# Patient Record
Sex: MALE | Race: WHITE | RURAL
[De-identification: names, ages, dates, MRNs, and addresses within clinical notes are randomized per-mention and may not be internally consistent; named-entity substitution may affect disease eponyms.]

---

## 2019-01-03 ENCOUNTER — OFFICE VISIT (OUTPATIENT)
Dept: FAMILY MEDICINE CLINIC | Age: 44
End: 2019-01-03

## 2019-01-03 VITALS
TEMPERATURE: 98.5 F | HEART RATE: 71 BPM | DIASTOLIC BLOOD PRESSURE: 81 MMHG | BODY MASS INDEX: 30.66 KG/M2 | RESPIRATION RATE: 16 BRPM | OXYGEN SATURATION: 98 % | WEIGHT: 207 LBS | HEIGHT: 69 IN | SYSTOLIC BLOOD PRESSURE: 132 MMHG

## 2019-01-03 DIAGNOSIS — H92.01 RIGHT EAR PAIN: ICD-10-CM

## 2019-01-03 DIAGNOSIS — F17.200 SMOKER: ICD-10-CM

## 2019-01-03 DIAGNOSIS — H65.01 RIGHT ACUTE SEROUS OTITIS MEDIA, RECURRENCE NOT SPECIFIED: Primary | ICD-10-CM

## 2019-01-03 PROBLEM — R03.0 ELEVATED BLOOD PRESSURE READING: Status: ACTIVE | Noted: 2019-01-03

## 2019-01-03 RX ORDER — AMOXICILLIN AND CLAVULANATE POTASSIUM 875; 125 MG/1; MG/1
1 TABLET, FILM COATED ORAL EVERY 12 HOURS
Qty: 14 TAB | Refills: 0 | Status: CANCELLED | OUTPATIENT
Start: 2019-01-03 | End: 2019-01-10

## 2019-01-03 RX ORDER — CEFDINIR 300 MG/1
300 CAPSULE ORAL 2 TIMES DAILY
Qty: 20 CAP | Refills: 0 | Status: SHIPPED | OUTPATIENT
Start: 2019-01-03 | End: 2019-01-13

## 2019-01-03 NOTE — PROGRESS NOTES
[facility] Residency Program  
Outpatient Resident Progress Note Encounter Date: 1/3/2019 Chief Complaint Patient presents with Asha Melgar [facility]  Jaw Pain History of Present Illness Patient is a 39 y.o. male, who presents to clinic for Establish Care and Jaw Pain Patient presents with Rt ear pain for the last 2 weeks. The pain is constant, 5/10 in pain scale, and mildly affects his hearing. He reports no secretions or blood from the ear canal. Denies rash, fever, fatigue, dizziness, lightheadedness, headaches, changes in vision, cough, sore throat, CP, SOB, sputum production, nausea, vomiting, or any other complains at this moment. Reports no ill contacts or recent swimming activities. Review of Systems A complete ROS was reviewed and only pertinent items documented on HPI. Allergies - reviewed:  
No Known Allergies Medications - reviewed:  
Current Outpatient Medications Medication Sig  cefdinir (OMNICEF) 300 mg capsule Take 1 Cap by mouth two (2) times a day for 10 days. No current facility-administered medications for this visit. Past Medical History - reviewed: 
Past Medical History:  
Diagnosis Date  Cancer ([facility] [address] Family Medical History - reviewed: 
No family history on file. Objective Visit Vitals /81 (BP 1 Location: Right arm, BP Patient Position: Sitting) Pulse 71 Temp 98.5 °F (36.9 °C) (Oral) Resp 16 Ht 5' 9\" (1.753 m) Wt 207 lb (93.9 kg) SpO2 98% BMI 30.57 kg/m² Body mass index is 30.57 kg/m². Nursing note and vitals reviewed. Physical Exam 
Constitutional: Well-developed, well-nourished, and in no distress. Obese. HENT:  
Head: Normocephalic and atraumatic. Right Ear: Normal ear cannel. Bulging of TM with serous fluid. Erythematous TM. Left Ear: External ear normal.  
Nose: Nose normal.  
Mouth/Throat: Oropharynx is clear and moist. No oropharyngeal erythema, no exudate. Eyes: Conjunctivae and EOM are normal. Pupils are equal, round, and reactive to light. Right eye exhibits no discharge. Left eye exhibits no discharge. No scleral icterus. Neck: Normal range of motion. Neck supple. No JVD present. No tracheal deviation present. No thyromegaly present. Lymphadenopathy: No cervical adenopathy. Cardiovascular: Normal rate, regular rhythm, normal heart sounds and intact distal pulses. Exam reveals no gallop and no friction rub. No murmur heard. Pulmonary/Chest: Effort normal and breath sounds normal. No respiratory distress. No wheezes, no rales, no tenderness. Neurological: Alert and oriented. No focal findings. Skin: Skin is warm and dry. No rash noted. Not diaphoretic. No erythema. No pallor. Assessment / Plan Mr. Billy Carrillo is a 39 y.o. male with the following medical condition(s): 1. Right acute serous otitis media, recurrence not specified 
- cefdinir (OMNICEF) 300 mg capsule; Take 1 Cap by mouth two (2) times a day for 10 days. Dispense: 20 Cap; Refill: 0 
 
2. Smoker The patient was counseled on the dangers of tobacco use, and was advised to quit. Reviewed strategies to maximize success, including removing cigarettes and smoking materials from environment, stress management, substitution of other forms of reinforcement, support of family/friends and written materials. 1 PPD for the last 28 years. Patient states that he will think about quitting seriously. Patient was advised to return to clinic in case of worsening of symptoms or fail to improve. Life threatening signs and symptoms were discussed and patient advised to go to the nearest ED for prompt evaluation and care in case of onset of any of these. Follow-up Disposition: 
Return if symptoms worsen or fail to improve. · I have discussed the diagnosis with the patient and the intended plan as seen in the above orders.   The patient has received an after-visit summary and questions were answered concerning future plans. I have discussed medication side effects and warnings with the patient as well. Patient/Plan discussed with Dr. Zeenat Katz (Attending Physician) Isaac Arias MD 
PGY-3 Family Medicine Resident Encounter Date: 1/3/2019

## 2019-01-03 NOTE — PROGRESS NOTES
I discussed the findings, assessment and plan for Omnicef in detail with the resident and agree with the resident's findings and plan as documented in the resident's note. Duyen Dorman M.D.

## 2019-01-03 NOTE — PATIENT INSTRUCTIONS
Stopping Smoking: Care Instructions Your Care Instructions Cigarette smokers crave the nicotine in cigarettes. Giving it up is much harder than simply changing a habit. Your body has to stop craving the nicotine. It is hard to quit, but you can do it. There are many tools that people use to quit smoking. You may find that combining tools works best for you. There are several steps to quitting. First you get ready to quit. Then you get support to help you. After that, you learn new skills and behaviors to become a nonsmoker. For many people, a necessary step is getting and using medicine. Your doctor will help you set up the plan that best meets your needs. You may want to attend a smoking cessation program to help you quit smoking. When you choose a program, look for one that has proven success. Ask your doctor for ideas. You will greatly increase your chances of success if you take medicine as well as get counseling or join a cessation program. 
Some of the changes you feel when you first quit tobacco are uncomfortable. Your body will miss the nicotine at first, and you may feel short-tempered and grumpy. You may have trouble sleeping or concentrating. Medicine can help you deal with these symptoms. You may struggle with changing your smoking habits and rituals. The last step is the tricky one: Be prepared for the smoking urge to continue for a time. This is a lot to deal with, but keep at it. You will feel better. Follow-up care is a key part of your treatment and safety. Be sure to make and go to all appointments, and call your doctor if you are having problems. It's also a good idea to know your test results and keep a list of the medicines you take. How can you care for yourself at home? · Ask your family, friends, and coworkers for support. You have a better chance of quitting if you have help and support. · Join a support group, such as Nicotine Anonymous, for people who are trying to quit smoking. · Consider signing up for a smoking cessation program, such as the American Lung Association's Freedom from Smoking program. 
· Get text messaging support. Go to the website at www.smokefree. gov to sign up for the [facility] program. 
· Set a quit date. Pick your date carefully so that it is not right in the middle of a big deadline or stressful time. Once you quit, do not even take a puff. Get rid of all ashtrays and lighters after your last cigarette. Clean your house and your clothes so that they do not smell of smoke. · Learn how to be a nonsmoker. Think about ways you can avoid those things that make you reach for a cigarette. ? Avoid situations that put you at greatest risk for smoking. For some people, it is hard to have a drink with friends without smoking. For others, they might skip a coffee break with coworkers who smoke. ? Change your daily routine. Take a different route to work or eat a meal in a different place. · Cut down on stress. Calm yourself or release tension by doing an activity you enjoy, such as reading a book, taking a hot bath, or gardening. · Talk to your doctor or pharmacist about nicotine replacement therapy, which replaces the nicotine in your body. You still get nicotine but you do not use tobacco. Nicotine replacement products help you slowly reduce the amount of nicotine you need. These products come in several forms, many of them available over-the-counter: ? Nicotine patches ? Nicotine gum and lozenges ? Nicotine inhaler · Ask your doctor about bupropion (Wellbutrin) or varenicline (Chantix), which are prescription medicines. They do not contain nicotine. They help you by reducing withdrawal symptoms, such as stress and anxiety. · Some people find hypnosis, acupuncture, and massage helpful for ending the smoking habit. · Eat a healthy diet and get regular exercise. Having healthy habits will help your body move past its craving for nicotine. · Be prepared to keep trying. Most people are not successful the first few times they try to quit. Do not get mad at yourself if you smoke again. Make a list of things you learned and think about when you want to try again, such as next week, next month, or next year. Where can you learn more? Go to http://dick-yanely.info/. Enter X682 in the search box to learn more about \"Stopping Smoking: Care Instructions. \" Current as of: November 29, 2017 Content Version: 11.8 © 8034-8486 [employer]. Care instructions adapted under license by [employer] (which disclaims liability or warranty for this information). If you have questions about a medical condition or this instruction, always ask your healthcare professional. [address] any warranty or liability for your use of this information. Ear Infection (Otitis Media): Care Instructions Your Care Instructions An ear infection may start with a cold and affect the middle ear (otitis media). It can hurt a lot. Most ear infections clear up on their own in a couple of days. Most often you will not need antibiotics. This is because many ear infections are caused by a virus. Antibiotics don't work against a virus. Regular doses of pain medicines are the best way to reduce your fever and help you feel better. Follow-up care is a key part of your treatment and safety. Be sure to make and go to all appointments, and call your doctor if you are having problems. It's also a good idea to know your test results and keep a list of the medicines you take. How can you care for yourself at home? · Take pain medicines exactly as directed. ? If the doctor gave you a prescription medicine for pain, take it as prescribed.  
? If you are not taking a prescription pain medicine, take an over-the-counter medicine, such as acetaminophen (Tylenol), ibuprofen (Advil, Motrin), or naproxen (Aleve). Read and follow all instructions on the label. ? Do not take two or more pain medicines at the same time unless the doctor told you to. Many pain medicines have acetaminophen, which is Tylenol. Too much acetaminophen (Tylenol) can be harmful. · Plan to take a full dose of pain reliever before bedtime. Getting enough sleep will help you get better. · Try a warm, moist washcloth on the ear. It may help relieve pain. · If your doctor prescribed antibiotics, take them as directed. Do not stop taking them just because you feel better. You need to take the full course of antibiotics. When should you call for help? Call your doctor now or seek immediate medical care if: 
  · You have new or increasing ear pain.  
  · You have new or increasing pus or blood draining from your ear.  
  · You have a fever with a stiff neck or a severe headache.  
 Watch closely for changes in your health, and be sure to contact your doctor if: 
  · You have new or worse symptoms.  
  · You are not getting better after taking an antibiotic for 2 days. Where can you learn more? Go to http://dick-yanely.info/. Enter W431 in the search box to learn more about \"Ear Infection (Otitis Media): Care Instructions. \" Current as of: March 28, 2018 Content Version: 11.8 © 8143-9006 [employer]. Care instructions adapted under license by [employer] (which disclaims liability or warranty for this information). If you have questions about a medical condition or this instruction, always ask your healthcare professional. [place] [address] any warranty or liability for your use of this information.

## 2019-01-03 NOTE — PROGRESS NOTES
1. Have you been to the ER, urgent care clinic since your last visit? Hospitalized since your last visit? No 
 
2. Have you seen or consulted any other health care providers outside of the [address] [place] [address] since your last visit? Include any pap smears or colon screening. No 
Reviewed record in preparation for visit and have necessary documentation Pt did not bring medication to office visit for review Goals that were addressed and/or need to be completed during or after this appointment include Health Maintenance Due Topic Date Due  
 DTaP/Tdap/Td series (1 - Tdap) 06/20/1995  Influenza Age 5 to Adult  08/01/2018 Patient declines all immunizations.

## 2019-10-22 ENCOUNTER — OFFICE VISIT (OUTPATIENT)
Dept: FAMILY MEDICINE CLINIC | Age: 44
End: 2019-10-22

## 2019-10-22 DIAGNOSIS — R03.0 ELEVATED BLOOD PRESSURE READING: Primary | ICD-10-CM

## 2019-10-22 DIAGNOSIS — Z00.00 WELL ADULT EXAM: ICD-10-CM

## 2019-10-22 DIAGNOSIS — C64.9 MALIGNANT NEOPLASM OF KIDNEY, UNSPECIFIED LATERALITY (HCC): ICD-10-CM

## 2019-10-22 DIAGNOSIS — Z72.0 TOBACCO ABUSE: ICD-10-CM

## 2019-10-22 DIAGNOSIS — Z28.21 INFLUENZA VACCINATION DECLINED: ICD-10-CM

## 2019-10-22 DIAGNOSIS — Z13.220 LIPID SCREENING: ICD-10-CM

## 2019-10-24 VITALS
OXYGEN SATURATION: 97 % | DIASTOLIC BLOOD PRESSURE: 89 MMHG | WEIGHT: 204 LBS | BODY MASS INDEX: 30.21 KG/M2 | HEIGHT: 69 IN | RESPIRATION RATE: 16 BRPM | HEART RATE: 83 BPM | SYSTOLIC BLOOD PRESSURE: 138 MMHG | TEMPERATURE: 98.7 F

## 2019-10-24 LAB
ALBUMIN SERPL-MCNC: 5 G/DL (ref 3.5–5.5)
ALBUMIN/GLOB SERPL: 1.9 {RATIO} (ref 1.2–2.2)
ALP SERPL-CCNC: 121 IU/L (ref 39–117)
ALT SERPL-CCNC: 25 IU/L (ref 0–44)
AST SERPL-CCNC: 21 IU/L (ref 0–40)
BILIRUB SERPL-MCNC: 0.4 MG/DL (ref 0–1.2)
BUN SERPL-MCNC: 10 MG/DL (ref 6–24)
BUN/CREAT SERPL: 11 (ref 9–20)
CALCIUM SERPL-MCNC: 9.9 MG/DL (ref 8.7–10.2)
CHLORIDE SERPL-SCNC: 98 MMOL/L (ref 96–106)
CHOLEST SERPL-MCNC: 211 MG/DL (ref 100–199)
CO2 SERPL-SCNC: 25 MMOL/L (ref 20–29)
CREAT SERPL-MCNC: 0.91 MG/DL (ref 0.76–1.27)
GLOBULIN SER CALC-MCNC: 2.6 G/DL (ref 1.5–4.5)
GLUCOSE SERPL-MCNC: 93 MG/DL (ref 65–99)
HDLC SERPL-MCNC: 44 MG/DL
LDLC SERPL CALC-MCNC: 118 MG/DL (ref 0–99)
POTASSIUM SERPL-SCNC: 4.6 MMOL/L (ref 3.5–5.2)
PROT SERPL-MCNC: 7.6 G/DL (ref 6–8.5)
SODIUM SERPL-SCNC: 136 MMOL/L (ref 134–144)
TRIGL SERPL-MCNC: 244 MG/DL (ref 0–149)
VLDLC SERPL CALC-MCNC: 49 MG/DL (ref 5–40)

## 2019-10-25 PROBLEM — Z72.0 TOBACCO ABUSE: Status: ACTIVE | Noted: 2019-10-25

## 2019-10-25 PROBLEM — H65.01 RIGHT ACUTE SEROUS OTITIS MEDIA: Status: RESOLVED | Noted: 2019-01-03 | Resolved: 2019-10-25

## 2019-10-25 PROBLEM — C64.9 CANCER OF KIDNEY (HCC): Status: ACTIVE | Noted: 2019-10-25

## 2019-10-25 PROBLEM — E66.9 OBESITY (BMI 30-39.9): Status: ACTIVE | Noted: 2019-10-25

## 2019-10-25 NOTE — PROGRESS NOTES
Results noted. . ASCVD risk score 7.9%. Per USPSTF guidelines recommends trial of lifestyle modifications before starting a statin. Will call and discuss options with the patient.      Lady Velasquez MD  PGY2 Family Medicine Resident

## 2019-10-25 NOTE — PROGRESS NOTES
Progress Note    Patient: Tracee Wu MRN: [id number]  SSN: [id number]    YOB: 1975  Age: 38 y.o. Sex: male        Chief Complaint   Patient presents with    Annual Wellness Visit         Subjective:     Problems addressed:  Encounter Diagnoses     ICD-10-CM ICD-9-CM   1. Elevated blood pressure reading R03.0 796.2   2. Tobacco abuse Z72.0 305.1   3. Malignant neoplasm of kidney, unspecified laterality (HCC) C64.9 189.0   4. Well adult exam Z00.00 V70.0   5. Influenza vaccination declined Z28.21 V64.06   6. Lipid screening Z13.220 V77.91       37 y/o M presents for annual wellness visit. Pt says he had an elevated BP at work and needs to get checked out. Is not currently taking any medications. Has a history of kidney cancer for which he underwent partial kidney resection years ago and has been in remission since, followed by Nephrologist and gets scans every 6 months which have most recently been normal. Pt still smoking, 1.5 PPD, occasionally vapes, and sometimes uses chewing tobacco. Pt does not want a flu shot today. Current and past medical information:    Current Medications after this visit[de-identified]     No current outpatient medications on file. No current facility-administered medications for this visit.         Patient Active Problem List    Diagnosis Date Noted    Tobacco abuse 10/25/2019    Cancer of kidney ([facility] [address] 10/25/2019    Obesity (BMI 30-39.9) 10/25/2019    Elevated blood pressure reading 01/03/2019       Past Medical History:   Diagnosis Date    Cancer ([facility] [address]        No Known Allergies    Past Surgical History:   Procedure Laterality Date    HX ORTHOPAEDIC         Social History     Tobacco Use    Smoking status: Current Every Day Smoker     Packs/day: 1.50    Smokeless tobacco: Current User     Types: Chew, Snuff   Substance Use Topics    Alcohol use: Yes     Frequency: Monthly or less    Drug use: No         Review of Systems   Constitutional: Negative for chills and fever. HENT: Negative for sore throat. Respiratory: Negative for shortness of breath. Cardiovascular: Negative for chest pain and palpitations. Gastrointestinal: Negative for nausea and vomiting. Neurological: Negative for headaches. Endo/Heme/Allergies: Negative for environmental allergies. Objective:     Vitals:    10/22/19 1417   BP: 138/89   Pulse: 83   Resp: 16   Temp: 98.7 °F (37.1 °C)   SpO2: 97%   Weight: 204 lb (92.5 kg)   Height: 5' 9\" (1.753 m)      Body mass index is 30.13 kg/m². Physical Exam   Constitutional: He appears well-developed and well-nourished. No distress. HENT:   Head: Normocephalic and atraumatic. Right Ear: External ear normal.   Left Ear: External ear normal.   Mouth/Throat: Oropharynx is clear and moist. No oropharyngeal exudate. Eyes: Pupils are equal, round, and reactive to light. Conjunctivae are normal.   Neck: Neck supple. Cardiovascular: Normal rate, regular rhythm and normal heart sounds. No murmur heard. Pulmonary/Chest: Effort normal and breath sounds normal. No respiratory distress. He has no wheezes. Abdominal: Soft. Bowel sounds are normal. He exhibits no distension. There is no tenderness. Musculoskeletal: Normal range of motion. He exhibits no edema or deformity. Lymphadenopathy:     He has no cervical adenopathy. Neurological: He is alert. Skin: Skin is warm and dry. He is not diaphoretic. No erythema. Psychiatric: He has a normal mood and affect. Vitals reviewed. Health Maintenance Due   Topic Date Due    Pneumococcal 0-64 years (1 of 1 - PPSV23) 06/20/1980    Influenza Age 5 to Adult  08/01/2019       Assessment and orders:     Encounter Diagnoses     ICD-10-CM ICD-9-CM   1. Elevated blood pressure reading R03.0 796.2   2. Tobacco abuse Z72.0 305.1   3. Malignant neoplasm of kidney, unspecified laterality (HCC) C64.9 189.0   4. Well adult exam Z00.00 V70.0   5.  Influenza vaccination declined Z28.21 V64.06   6. Lipid screening Z13.220 V77.91       39 y/o M with borderline HTN and tobacco abuse    1. Elevated blood pressure reading - /89 today  -Will continue close monitoring and start medications as indicated  -CMP    2. Tobacco abuse - 1.5 PPD, vaping, and chewing tobacco, pt said he tried to quit smoking by vaping but failed  -Encouraging cessation, pt not willing to quit at this time    3. Malignant neoplasm of kidney, unspecified laterality ([facility] [address] - currently in remission  -Continue to f/u with Nephrologist  -CMP    4. Influenza vaccination declined    5. Lipid screening  -Lipid panel        Plan of care:  Discussed diagnoses in detail with patient. Medication risks/benefits/side effects discussed with patient. All of the patient's questions were addressed. The patient understands and agrees with our plan of care. The patient knows to call back if they are unsure of or forget any changes we discussed today or if the symptoms change. The patient received an After-Visit Summary which contains VS, orders, medication list and allergy list. This can be used as a \"mini-medical record\" should they have to seek medical care while out of town. Follow-up and Dispositions    · Return in about 1 month (around 11/22/2019) for Chronic Medical Problems, Blood Pressure Check. No future appointments.     Signed By: Wilton Diaz MD     October 25, 2019

## 2019-10-29 ENCOUNTER — TELEPHONE (OUTPATIENT)
Dept: FAMILY MEDICINE CLINIC | Age: 44
End: 2019-10-29

## 2019-10-29 NOTE — TELEPHONE ENCOUNTER
Called and discussed lab results with patient. Will move forward with lifestyle modifications for elevated cholesterol. Pt said he would stop by clinic today for copy of lab results as he would be nearby for dental apt. Pt instructed to make follow up appointment for blood pressure check. Pt voiced understanding and agreement. All questions answered.      Tonie Santos MD  11:59 AM

## 2019-11-05 ENCOUNTER — TELEPHONE (OUTPATIENT)
Dept: FAMILY MEDICINE CLINIC | Age: 44
End: 2019-11-05

## 2019-11-05 NOTE — TELEPHONE ENCOUNTER
----- Message from Alondra King sent at 11/5/2019 12:55 PM EST -----  Regarding: Dr. Maru Patel/telephone   Pt called to see if the office has received his medical records from [place].  Best contact number is [phone]

## 2019-11-14 ENCOUNTER — OFFICE VISIT (OUTPATIENT)
Dept: FAMILY MEDICINE CLINIC | Age: 44
End: 2019-11-14

## 2019-11-14 VITALS
HEIGHT: 69 IN | BODY MASS INDEX: 29.77 KG/M2 | OXYGEN SATURATION: 98 % | RESPIRATION RATE: 16 BRPM | TEMPERATURE: 98.3 F | DIASTOLIC BLOOD PRESSURE: 75 MMHG | WEIGHT: 201 LBS | SYSTOLIC BLOOD PRESSURE: 133 MMHG | HEART RATE: 90 BPM

## 2019-11-14 DIAGNOSIS — Z72.0 TOBACCO ABUSE: ICD-10-CM

## 2019-11-14 DIAGNOSIS — R03.0 ELEVATED BLOOD PRESSURE READING: ICD-10-CM

## 2019-11-14 DIAGNOSIS — G47.9 TROUBLE IN SLEEPING: ICD-10-CM

## 2019-11-14 DIAGNOSIS — F90.9 ATTENTION DEFICIT HYPERACTIVITY DISORDER (ADHD), UNSPECIFIED ADHD TYPE: Primary | ICD-10-CM

## 2019-11-14 NOTE — LETTER
NOTIFICATION RETURN TO WORK 
 
11/14/2019 4:18 PM 
 
Mr. Abbi Barragan [address] [address] To Whom It May Concern: 
 
Abbi Barragan is currently under the care of [facility]. He will return to work on: 11/15/19. If there are questions or concerns please have the patient contact our office. Sincerely, Linda Campos MD

## 2019-11-14 NOTE — PROGRESS NOTES
1. Have you been to the ER, urgent care clinic, or been hospitalized since your last visit? No     2. Have you seen or consulted any other health care providers outside of the [address] [place] [address] since your last visit? No     Reviewed record in preparation for visit and have necessary documentation  Goals that were addressed and/or need to be completed during or after this appointment include   Health Maintenance Due   Topic Date Due    Pneumococcal 0-64 years (1 of 1 - PPSV23) 06/20/1980    Influenza Age 5 to Adult  08/01/2019       I have received verbal consent from [place] to discuss any/all medical information while others present in the room.

## 2019-11-14 NOTE — PROGRESS NOTES
Progress Note    Patient: Sima Asencio MRN: [id number]  SSN: [id number]    YOB: 1975  Age: 38 y.o. Sex: male        Chief Complaint   Patient presents with    Behavioral Problem         Subjective:     Problems addressed:  Encounter Diagnoses     ICD-10-CM ICD-9-CM   1. Attention deficit hyperactivity disorder (ADHD), unspecified ADHD type F90.9 314.01   2. Elevated blood pressure reading R03.0 796.2   3. Tobacco abuse Z72.0 305.1   4. Trouble in sleeping G47.9 780.50     37 y/o M presents for behavioral problem. Pt states \"every year or so, I can't turn off my brain\", has temper issues, has gone through 2 marriages in the past, and symptoms worsening for past 6 months. Racing thoughts and trouble falling asleep are most bothersome. Says he was diagnosed with ADHD in the past by [facility] in [place], and currently he is trying to see if there are any other underlying problems as there is a program at [employer] that [place] hires people with disabilities\" and ADHD by itself is not enough. Pt is trying to gather different medical records from his past. He says he stopped taking adderal 2 years ago because it did not help him very much. Pt is not currently seeing a psychiatrist. Pt denies any manic episodes in the past, but admits to some of the symptoms with racing thoughts, decreased need for sleep, and fired from his job once. Denies any hx of risky behavior. Current and past medical information:    Current Medications after this visit[de-identified]     No current outpatient medications on file. No current facility-administered medications for this visit.         Patient Active Problem List    Diagnosis Date Noted    Tobacco abuse 10/25/2019    Cancer of kidney ([facility] 10/25/2019    Obesity (BMI 30-39.9) 10/25/2019    Elevated blood pressure reading 01/03/2019       Past Medical History:   Diagnosis Date    Cancer ([facility]        No Known Allergies    Past Surgical History: Procedure Laterality Date    HX NEPHRECTOMY      Partial left    HX ORTHOPAEDIC         Social History     Tobacco Use    Smoking status: Current Every Day Smoker     Packs/day: 1.50    Smokeless tobacco: Current User     Types: Chew, Snuff   Substance Use Topics    Alcohol use: Yes     Frequency: Monthly or less    Drug use: No         Review of Systems   Constitutional: Negative for chills and fever. Cardiovascular: Negative for chest pain and palpitations. Neurological: Negative for dizziness and headaches. Psychiatric/Behavioral: Negative for depression and hallucinations. The patient has insomnia. The patient is not nervous/anxious. Racing thoughts         Objective:     Vitals:    11/14/19 1518 11/14/19 1520 11/14/19 1552   BP: 140/88 144/81 133/75   Pulse: 90     Resp: 16     Temp: 98.3 °F (36.8 °C)     TempSrc: Oral     SpO2: 98%     Weight: 201 lb (91.2 kg)     Height: 5' 9\" (1.753 m)        Body mass index is 29.68 kg/m². Physical Exam   Constitutional: He appears well-developed and well-nourished. No distress. HENT:   Head: Normocephalic and atraumatic. Right Ear: External ear normal.   Left Ear: External ear normal.   Eyes: Conjunctivae are normal.   Neck: Neck supple. Cardiovascular: Normal rate, regular rhythm and normal heart sounds. No murmur heard. Pulmonary/Chest: Effort normal and breath sounds normal. No respiratory distress. He has no wheezes. Musculoskeletal: Normal range of motion. He exhibits no edema. Lymphadenopathy:     He has no cervical adenopathy. Neurological: He is alert. Skin: Skin is warm and dry. He is not diaphoretic. Psychiatric: His speech is tangential. He is hyperactive. Health Maintenance Due   Topic Date Due    Pneumococcal 0-64 years (1 of 1 - PPSV23) 06/20/1980    Influenza Age 5 to Adult  08/01/2019       Assessment and orders:     Encounter Diagnoses     ICD-10-CM ICD-9-CM   1.  Attention deficit hyperactivity disorder (ADHD), unspecified ADHD type F90.9 314.01   2. Elevated blood pressure reading R03.0 796.2   3. Tobacco abuse Z72.0 305.1   4. Trouble in sleeping G47.9 780.50       37 y/o M with racing thoughts and insomnia with concern for underlying psychiatric disorder    1. Attention deficit hyperactivity disorder (ADHD), unspecified ADHD type - pt with history of ADHD and medical records from [facility] in [place] shows hx of being on multiple medications for ADHD, but no record of diagnosis, pt reports ADHD medications didn't really help him much, concern for underlying disorder such as bipolar, pt not requesting ADHD at this time, focused on collection medical records for hx of disability for new job opportunity? Does not like current job. - REFERRAL TO PSYCHIATRY, pt voiced agreement and understanding     2. Elevated blood pressure reading - /75 on recheck, initial elevation   -Will continue to monitor    3. Tobacco abuse - pt continues to smoke 1.5 PPD, not interested in quitting at this time  -Continue to encourage cessation     4. Trouble in sleeping - possibly related to underlying psychiatric disorder vs stress vs poor sleep hygiene   -Referral to psychiatry for evaluation, pt in agreement  -Encouraging good sleep hygiene  -Recommended pt try otc melatonin      Plan of care:  Discussed diagnoses in detail with patient. Medication risks/benefits/side effects discussed with patient. All of the patient's questions were addressed. The patient understands and agrees with our plan of care. The patient knows to call back if they are unsure of or forget any changes we discussed today or if the symptoms change. The patient received an After-Visit Summary which contains VS, orders, medication list and allergy list. This can be used as a \"mini-medical record\" should they have to seek medical care while out of town.     Follow-up and Dispositions    · Return if symptoms worsen or fail to improve. No future appointments.     Signed By: Roberto Velasquez MD     November 14, 2019

## 2019-11-14 NOTE — PATIENT INSTRUCTIONS

## 2019-11-18 NOTE — PROGRESS NOTES
I discussed the findings, assessment and plan in detail with the resident and agree with the resident's findings and plan as documented in the resident's note. Regi Hopkins M.D.

## 2020-01-15 ENCOUNTER — OFFICE VISIT (OUTPATIENT)
Dept: FAMILY MEDICINE CLINIC | Age: 45
End: 2020-01-15

## 2020-01-15 VITALS
RESPIRATION RATE: 18 BRPM | HEART RATE: 70 BPM | WEIGHT: 206 LBS | DIASTOLIC BLOOD PRESSURE: 84 MMHG | SYSTOLIC BLOOD PRESSURE: 118 MMHG | HEIGHT: 69 IN | OXYGEN SATURATION: 96 % | BODY MASS INDEX: 30.51 KG/M2 | TEMPERATURE: 97.7 F

## 2020-01-15 DIAGNOSIS — L03.115 CELLULITIS OF RIGHT LOWER EXTREMITY: Primary | ICD-10-CM

## 2020-01-15 RX ORDER — DOXYCYCLINE 100 MG/1
100 TABLET ORAL 2 TIMES DAILY
Qty: 14 TAB | Refills: 0 | Status: SHIPPED | OUTPATIENT
Start: 2020-01-15 | End: 2020-01-22

## 2020-01-15 NOTE — PATIENT INSTRUCTIONS
- Take Motrin or Naproxen with Tylenol January 15, 2020 [address] [address] Dear Cornel Rodriguez: Thank you for requesting access to Personaling. Please follow the instructions below to view your test results, access and download parts of your medical record, view details of your past and upcoming appointments, and view your medications online. How Do I Sign Up? 1. In your internet browser, go to [email or web address] 2. Click on the First Time User? Click Here link in the Sign In box. You will see the New Member Sign Up page. 3. Enter your Personaling Access Code exactly as it appears below. You will not need to use this code after youve completed the sign-up process. If you do not sign up before the expiration date, you must request a new code. · Personaling Access Code: [email or web address] · Expires: 2/29/2020  9:36 AM 
 
4. Enter the last four digits of your Social Security Number (xxxx) and Date of Birth (mm/dd/yyyy) as indicated and click Submit. You will be taken to the next sign-up page. 5. Create a Personaling ID. This will be your Personaling login ID and cannot be changed, so think of one that is secure and easy to remember. 6. Create a Personaling password. You can change your password at any time. 7. Enter your Password Reset Question and Answer. This can be used at a later time if you forget your password. 8. Enter your e-mail address. You will receive e-mail notification when new information is available in [address] 9. Click Sign Up. You can now view and download portions of your medical record. 10. Click the Download Summary menu link to download a portable copy of your medical information. Additional Information: If you require any assistance or have any questions, please contact our [place] at [phone], email at [email or web address] or check online in our Frequently Asked Questions. Remember, VoiceObject[employer]art is NOT to be used for urgent needs. For medical emergencies, dial 911. Now available from your iPhone and Android! Sincerely, Ralph Quinn

## 2020-01-15 NOTE — PROGRESS NOTES
Chief Complaint   Patient presents with    Leg Pain     right, started Monday     Visit Vitals  /84 (BP 1 Location: Right arm, BP Patient Position: Sitting)   Pulse 70   Temp 97.7 °F (36.5 °C) (Oral)   Resp 18   Ht 5' 9\" (1.753 m)   Wt 206 lb (93.4 kg)   SpO2 96%   BMI 30.42 kg/m²     1. Have you been to the ER, urgent care clinic since your last visit? Hospitalized since your last visit? No    2. Have you seen or consulted any other health care providers outside of the [address] [place] [address] since your last visit? Include any pap smears or colon screening.  No    Reviewed record in preparation for visit and have necessary documentation  Pt did not bring medication to office visit for review  opportunity was given for questions  Goals that were addressed and/or need to be completed during or after this appointment include   Health Maintenance Due   Topic Date Due    Pneumococcal 0-64 years (1 of 1 - PPSV23) 06/20/1980    DTaP/Tdap/Td series (1 - Tdap) 06/20/1985    Influenza Age 9 to Adult  08/01/2019

## 2020-01-15 NOTE — PROGRESS NOTES
[facility]    History of Present Illness:   Tamia Pichardo is a 38 y.o. male with history of Cancer of the kidney, tobacco use  CC: Right leg pain  History provided by patient and Records    HPI:  Right shin pain ongoing for 3 days with erythema and swelling. Noting pins and needles feeling with certain position and walking. Denies fevers, chills, nausea, vomiting. No known injury/trauma. No previous significant history noted. Patient is a  and is on legs  Regularly. Health Maintenance  Health Maintenance Due   Topic Date Due    Pneumococcal 0-64 years (1 of 1 - PPSV23) 06/20/1980    DTaP/Tdap/Td series (1 - Tdap) 06/20/1985    Influenza Age 5 to Adult  08/01/2019       Past Medical, Family, and Social History:     No current outpatient medications on file prior to visit. No current facility-administered medications on file prior to visit. Patient Active Problem List   Diagnosis Code    Elevated blood pressure reading R03.0    Tobacco abuse Z72.0    Cancer of kidney (HCC) C64.9    Obesity (BMI 30-39. 9) E66.9       Social History     Socioeconomic History    Marital status: SINGLE     Spouse name: Not on file    Number of children: Not on file    Years of education: Not on file    Highest education level: Not on file   Occupational History    Not on file   Social Needs    Financial resource strain: Not on file    Food insecurity:     Worry: Not on file     Inability: Not on file    Transportation needs:     Medical: Not on file     Non-medical: Not on file   Tobacco Use    Smoking status: Current Every Day Smoker     Packs/day: 1.50    Smokeless tobacco: Current User     Types: Chew, Snuff   Substance and Sexual Activity    Alcohol use: Yes     Frequency: Monthly or less    Drug use: No    Sexual activity: Not on file   Lifestyle    Physical activity:     Days per week: Not on file     Minutes per session: Not on file    Stress: Not on file Relationships    Social connections:     Talks on phone: Not on file     Gets together: Not on file     Attends Judaism service: Not on file     Active member of club or organization: Not on file     Attends meetings of clubs or organizations: Not on file     Relationship status: Not on file    Intimate partner violence:     Fear of current or ex partner: Not on file     Emotionally abused: Not on file     Physically abused: Not on file     Forced sexual activity: Not on file   Other Topics Concern    Not on file   Social History Narrative    Not on file       Review of Systems   Review of Systems   Constitutional: Negative for chills and fever. Respiratory: Negative for cough. Cardiovascular: Negative for chest pain and palpitations. Musculoskeletal: Negative for joint pain. Skin: Positive for rash. Objective:     Visit Vitals  /84 (BP 1 Location: Right arm, BP Patient Position: Sitting)   Pulse 70   Temp 97.7 °F (36.5 °C) (Oral)   Resp 18   Ht 5' 9\" (1.753 m)   Wt 206 lb (93.4 kg)   SpO2 96%   BMI 30.42 kg/m²        Physical Exam  Vitals signs and nursing note reviewed. Constitutional:       Appearance: Normal appearance. HENT:      Head: Normocephalic and atraumatic. Neck:      Musculoskeletal: Normal range of motion and neck supple. Cardiovascular:      Rate and Rhythm: Normal rate and regular rhythm. Pulses: Normal pulses. Heart sounds: Normal heart sounds. Pulmonary:      Effort: Pulmonary effort is normal.      Breath sounds: Normal breath sounds. Abdominal:      General: Abdomen is flat. Bowel sounds are normal.      Palpations: Abdomen is soft. Musculoskeletal: Normal range of motion. Comments: Right Shin: Mild erythema, increased heat, tenderness. Neurological:      Mental Status: He is alert. Pertinent Labs/Studies:      Assessment and orders:       ICD-10-CM ICD-9-CM    1.  Cellulitis of right lower extremity L03.115 682.6 doxycycline (ADOXA) 100 mg tablet     Diagnoses and all orders for this visit:    1. Cellulitis of right lower extremity: Presenting with Cellulitis without organized abscess present. Started on empiric antibiotics. Discussed red flag symptoms and when to return to clinic for reevaluation.  -     doxycycline (ADOXA) 100 mg tablet; Take 1 Tab by mouth two (2) times a day for 7 days. Follow-up and Dispositions    · Return if symptoms worsen or fail to improve. I have discussed the diagnosis with the patient and the intended plan as seen in the above orders. Social history, medical history, and labs were reviewed. The patient has received an after-visit summary and questions were answered concerning future plans. I have discussed medication side effects and warnings with the patient as well.     MD JANIE Kennedy & TARYN CASTILLO [facility]  01/15/20

## 2020-01-15 NOTE — LETTER
NOTIFICATION RETURN TO WORK / SCHOOL 
 
1/15/2020 3:10 PM 
 
Mr. Maria T Abrams [address] [facility] [address] [address] To Whom It May Concern: 
 
Maria T Abrams is currently under the care of Yrn Haywood. Please excuse any absence from 1/15/2020-1/19/2020. If there are questions or concerns please have the patient contact our office. Sincerely, Darya Crawley MD

## 2020-01-15 NOTE — LETTER
NOTIFICATION RETURN TO WORK / SCHOOL 
 
1/15/2020 3:09 PM 
 
Mr. Kacey Pérez [address] [facility] [address] [address] To Whom It May Concern: 
 
Kacey Pérez is currently under the care of Yrn Haywood. Please excuse any absence from 1/15/2020-1/17/2020. If there are questions or concerns please have the patient contact our office. Sincerely, Alaina Betancourt MD

## 2020-02-07 ENCOUNTER — OFFICE VISIT (OUTPATIENT)
Dept: FAMILY MEDICINE CLINIC | Age: 45
End: 2020-02-07

## 2020-02-07 VITALS
BODY MASS INDEX: 31.55 KG/M2 | HEIGHT: 69 IN | TEMPERATURE: 98.6 F | HEART RATE: 97 BPM | WEIGHT: 213 LBS | DIASTOLIC BLOOD PRESSURE: 79 MMHG | SYSTOLIC BLOOD PRESSURE: 134 MMHG | OXYGEN SATURATION: 98 % | RESPIRATION RATE: 18 BRPM

## 2020-02-07 DIAGNOSIS — J20.9 ACUTE BRONCHITIS, UNSPECIFIED ORGANISM: ICD-10-CM

## 2020-02-07 DIAGNOSIS — R50.9 FEVER, UNSPECIFIED FEVER CAUSE: ICD-10-CM

## 2020-02-07 DIAGNOSIS — J11.1 INFLUENZA: Primary | ICD-10-CM

## 2020-02-07 LAB
FLUAV+FLUBV AG NOSE QL IA.RAPID: NEGATIVE POS/NEG
FLUAV+FLUBV AG NOSE QL IA.RAPID: POSITIVE POS/NEG
VALID INTERNAL CONTROL?: YES

## 2020-02-07 RX ORDER — AZITHROMYCIN 250 MG/1
TABLET, FILM COATED ORAL
Qty: 6 TAB | Refills: 0 | Status: SHIPPED | OUTPATIENT
Start: 2020-02-07 | End: 2020-02-12

## 2020-02-07 NOTE — PATIENT INSTRUCTIONS
Bronchitis: Care Instructions  Your Care Instructions    Bronchitis is inflammation of the bronchial tubes, which carry air to the lungs. The tubes swell and produce mucus, or phlegm. The mucus and inflamed bronchial tubes make you cough. You may have trouble breathing. Most cases of bronchitis are caused by viruses like those that cause colds. Antibiotics usually do not help and they may be harmful. Bronchitis usually develops rapidly and lasts about 2 to 3 weeks in otherwise healthy people. Follow-up care is a key part of your treatment and safety. Be sure to make and go to all appointments, and call your doctor if you are having problems. It's also a good idea to know your test results and keep a list of the medicines you take. How can you care for yourself at home? · Take all medicines exactly as prescribed. Call your doctor if you think you are having a problem with your medicine. · Get some extra rest.  · Take an over-the-counter pain medicine, such as acetaminophen (Tylenol), ibuprofen (Advil, Motrin), or naproxen (Aleve) to reduce fever and relieve body aches. Read and follow all instructions on the label. · Do not take two or more pain medicines at the same time unless the doctor told you to. Many pain medicines have acetaminophen, which is Tylenol. Too much acetaminophen (Tylenol) can be harmful. · Take an over-the-counter cough medicine that contains dextromethorphan to help quiet a dry, hacking cough so that you can sleep. Avoid cough medicines that have more than one active ingredient. Read and follow all instructions on the label. · Breathe moist air from a humidifier, hot shower, or sink filled with hot water. The heat and moisture will thin mucus so you can cough it out. · Do not smoke. Smoking can make bronchitis worse. If you need help quitting, talk to your doctor about stop-smoking programs and medicines. These can increase your chances of quitting for good.   When should you call for help? Call 911 anytime you think you may need emergency care. For example, call if:    · You have severe trouble breathing.    Call your doctor now or seek immediate medical care if:    · You have new or worse trouble breathing.     · You cough up dark brown or bloody mucus (sputum).     · You have a new or higher fever.     · You have a new rash.    Watch closely for changes in your health, and be sure to contact your doctor if:    · You cough more deeply or more often, especially if you notice more mucus or a change in the color of your mucus.     · You are not getting better as expected. Where can you learn more? Go to http://dick-yanely.info/. Enter H333 in the search box to learn more about \"Bronchitis: Care Instructions. \"  Current as of: June 9, 2019  Content Version: 12.2  © 7953-0655 [employer]. Care instructions adapted under license by [employer] (which disclaims liability or warranty for this information). If you have questions about a medical condition or this instruction, always ask your healthcare professional. [address] any warranty or liability for your use of this information.

## 2020-02-07 NOTE — LETTER
NOTIFICATION RETURN TO WORK / SCHOOL 
 
2/7/2020 2:31 PM 
 
Mr. Maria T Abrams [address] [facility] [address] [address] To Whom It May Concern: 
 
Maria T Abrams is currently under the care of Yrn Haywood. He will return to work 2/10/2020. If there are questions or concerns please have the patient contact our office.  
 
 
 
Sincerely, 
 
 
Johan Gardiner MD

## 2020-02-07 NOTE — PROGRESS NOTES
Chief Complaint   Patient presents with    Fever    Nasal Congestion     Body mass index is 31.45 kg/m². 1. Have you been to the ER, urgent care clinic since your last visit? Hospitalized since your last visit? No    2. Have you seen or consulted any other health care providers outside of the [address] [place] [address] since your last visit? Include any pap smears or colon screening.  No    Reviewed record in preparation for visit and have necessary documentation  Pt did not bring medication to office visit for review  Information was given to pt on Advanced Directives, Living Will  Information was given on Shingles Vaccine  Opportunity was given for questions  Goals that were addressed and/or need to be completed after this appointment include:     Health Maintenance Due   Topic Date Due    Pneumococcal 0-64 years (1 of 1 - PPSV23) 06/20/1980    DTaP/Tdap/Td series (1 - Tdap) 06/20/1985    Influenza Age 9 to Adult  08/01/2019

## 2020-02-07 NOTE — PROGRESS NOTES
[address], [address] [place] [address]  [phone]           Progress Note    Patient: Spring Yuan MRN: [id number]  SSN: [id number]    YOB: 1975  Age: 38 y.o. Sex: male        Chief Complaint   Patient presents with    Fever    Nasal Congestion         Subjective:     Encounter Diagnoses   Name Primary?  Influenza: He started getting sick on Monday. He had a low-grade fever and cough. It seemed to get better the last evening when he spiked a fever to 102. He has no nausea no vomiting no diarrhea. He does smoke and he has a productive cough with brown sputum. I think this is a secondarily infected influenza A. Using Tamiflu at this point and his course would not be of any benefit. He has a meeting he has to go till Monday. He should be better and not contagious but I warned him to let me know if I need to extend his excuse. If he is not better by Monday he needs to come back and let us recheck him. Yes    Fever, unspecified fever cause: Tested positive for influenza A          Acute bronchitis, unspecified organism: See above. Secondary infection after influenza facilitated by smoking. Current and past medical information:    Current Medications after this visit[de-identified]     Current Outpatient Medications   Medication Sig    azithromycin (ZITHROMAX) 250 mg tablet Take 2 tablets today, then take 1 tablet daily     No current facility-administered medications for this visit.         Patient Active Problem List    Diagnosis Date Noted    Tobacco abuse 10/25/2019    Cancer of kidney ([facility] 10/25/2019    Obesity (BMI 30-39.9) 10/25/2019    Elevated blood pressure reading 01/03/2019       Past Medical History:   Diagnosis Date    Cancer ([facility]        No Known Allergies    Past Surgical History:   Procedure Laterality Date    HX NEPHRECTOMY      Partial left    HX ORTHOPAEDIC         Social History Socioeconomic History    Marital status: SINGLE     Spouse name: Not on file    Number of children: Not on file    Years of education: Not on file    Highest education level: Not on file   Tobacco Use    Smoking status: Current Every Day Smoker     Packs/day: 1.50    Smokeless tobacco: Current User     Types: Chew, Snuff   Substance and Sexual Activity    Alcohol use: Yes     Frequency: Monthly or less    Drug use: No       Review of Systems   Constitutional: Positive for fever and malaise/fatigue. Negative for chills and weight loss. HENT: Positive for congestion. Negative for hearing loss. Eyes: Negative. Negative for blurred vision and double vision. Respiratory: Positive for cough and sputum production. Negative for shortness of breath. Brown sputum. Cardiovascular: Negative. Negative for chest pain and palpitations. It hurts him to cough. Gastrointestinal: Negative. Negative for abdominal pain, blood in stool, heartburn, nausea and vomiting. Genitourinary: Negative. Musculoskeletal: Positive for myalgias. Skin: Negative. Negative for rash. Neurological: Negative. Negative for dizziness and headaches. Endo/Heme/Allergies: Negative. Objective:     Vitals:    02/07/20 1411   BP: 134/79   Pulse: 97   Resp: 18   Temp: 98.6 °F (37 °C)   TempSrc: Oral   SpO2: 98%   Weight: 213 lb (96.6 kg)   Height: 5' 9\" (1.753 m)      Body mass index is 31.45 kg/m². Physical Exam  Vitals signs and nursing note reviewed. Constitutional:       General: He is not in acute distress. Appearance: Normal appearance. He is well-developed. He is not ill-appearing, toxic-appearing or diaphoretic. HENT:      Head: Normocephalic and atraumatic. Nose: No congestion. Mouth/Throat:      Pharynx: No oropharyngeal exudate or posterior oropharyngeal erythema. Eyes:      General: No scleral icterus. Right eye: No discharge. Left eye: No discharge. Neck:      Musculoskeletal: No neck rigidity. Cardiovascular:      Rate and Rhythm: Normal rate and regular rhythm. Heart sounds: Normal heart sounds. No murmur. No friction rub. No gallop. Pulmonary:      Effort: Pulmonary effort is normal. No respiratory distress. Breath sounds: Rhonchi present. No wheezing or rales. Comments: He has wet rhonchi primarily in his right lower lobe  Abdominal:      General: There is no distension. Musculoskeletal:         General: No swelling. Skin:     General: Skin is warm. Coloration: Skin is not jaundiced. Findings: No erythema or rash. Neurological:      Mental Status: He is alert. Health Maintenance Due   Topic Date Due    Pneumococcal 0-64 years (1 of 1 - PPSV23) 06/20/1980    DTaP/Tdap/Td series (1 - Tdap) 06/20/1985    Influenza Age 5 to Adult  08/01/2019         Assessment and orders:     Encounter Diagnoses     ICD-10-CM ICD-9-CM   1. Influenza J11.1 487.1   2. Fever, unspecified fever cause R50.9 780.60   3. Acute bronchitis, unspecified organism J20.9 466.0     Diagnoses and all orders for this visit:    1. Influenza-test positive for influenza A. Symptoms are 3days old at this time Tamiflu would not help. 2. Fever, unspecified fever cause-this is due to a combination of his influenza but the spike in his temperature last night is consistent with a secondary bacterial infection.  -     AMB POC RAPID INFLUENZA TEST    3. Acute bronchitis, unspecified organism-he will take Tylenol arthritis 1 every 8 hours drink plenty fluids and rest this weekend. Recommended Mucinex DM for coughing. He will contact me if he is not able to return to work on Monday. -     azithromycin (ZITHROMAX) 250 mg tablet; Take 2 tablets today, then take 1 tablet daily    Plan of care:  Discussed diagnoses in detail with patient. Medication risks/benefits/side effects discussed with patient. All of the patient's questions were addressed. The patient understands and agrees with our plan of care. The patient knows to call back if they are unsure of or forget any changes we discussed today or if the symptoms change. The patient received an After-Visit Summary which contains VS, orders, medication list and allergy list. This can be used as a \"mini-medical record\" should they have to seek medical care while out of town. Patient Care Team:  Debi Levin MD as PCP - General (Family Practice)  Karon Pardo MD as PCP - [facility] Empaneled Provider    Follow-up and Dispositions    · Return if symptoms worsen or fail to improve. Signed By: Toni Panda MD     February 7, 2020      ATTENTION:   This medical record was transcribed using an electronic medical records/speech recognition system. Although proofread, it may and can contain electronic, spelling and other errors. Corrections may be executed at a later time. Please feel free to contact me for any clarifications as needed.

## 2020-02-10 ENCOUNTER — OFFICE VISIT (OUTPATIENT)
Dept: FAMILY MEDICINE CLINIC | Age: 45
End: 2020-02-10

## 2020-02-10 VITALS
WEIGHT: 208 LBS | HEART RATE: 74 BPM | SYSTOLIC BLOOD PRESSURE: 139 MMHG | RESPIRATION RATE: 20 BRPM | BODY MASS INDEX: 30.81 KG/M2 | OXYGEN SATURATION: 96 % | TEMPERATURE: 98.8 F | HEIGHT: 69 IN | DIASTOLIC BLOOD PRESSURE: 91 MMHG

## 2020-02-10 DIAGNOSIS — R03.0 ELEVATED BP WITHOUT DIAGNOSIS OF HYPERTENSION: ICD-10-CM

## 2020-02-10 DIAGNOSIS — H65.92 SOM (SECRETORY OTITIS MEDIA), LEFT: ICD-10-CM

## 2020-02-10 DIAGNOSIS — J45.40 MODERATE PERSISTENT ASTHMATIC BRONCHITIS WITHOUT COMPLICATION: ICD-10-CM

## 2020-02-10 DIAGNOSIS — J09.X2 INFLUENZA A (H5N1): Primary | ICD-10-CM

## 2020-02-10 RX ORDER — ALBUTEROL SULFATE 90 UG/1
2 AEROSOL, METERED RESPIRATORY (INHALATION)
Qty: 1 INHALER | Refills: 1 | Status: SHIPPED | OUTPATIENT
Start: 2020-02-10 | End: 2021-02-04

## 2020-02-10 RX ORDER — CEFPROZIL 500 MG/1
500 TABLET, FILM COATED ORAL 2 TIMES DAILY
Qty: 20 TAB | Refills: 0 | Status: SHIPPED | OUTPATIENT
Start: 2020-02-10 | End: 2020-02-20

## 2020-02-10 NOTE — PROGRESS NOTES
[address]  [phone]           Progress Note    Patient: Kacey Pérez MRN: [id number]  SSN: [id number]    YOB: 1975  Age: 38 y.o. Sex: male        Chief Complaint   Patient presents with    Generalized Body Aches    Flu         Subjective:     Encounter Diagnoses   Name Primary?  Influenza A (H5N1): He is still running a low-grade fever to 100.1 this morning and still coughing with discolored phlegm. No chills or sweats. No blood in his sputum. He has not smoked in 5 days. Yes    Moderate persistent asthmatic bronchitis without complication\"  He still has some very mild wheezing on expiration. He has scattered wet rhonchi. Because of his lack of progress and symptoms I ordered a chest x-ray to make sure he did not have a focal patch of pneumonia. The chest x-ray was unremarkable.  LILLIAN (secretory otitis media), left: This is a both his ears were congested but only the left ear had fluid in it. He will use 3 days of a for nose spray to help this. Explained to him how to use it with the head tilted and turned about 5 degrees to to the side and back to get more of the medication around the eustachian tube.  Elevated BP without diagnosis of hypertension: Like for him to return in a week for repeat blood pressure. We will also recheck his lung exam at that time. Current and past medical information:    Current Medications after this visit[de-identified]     Current Outpatient Medications   Medication Sig    cefPROZIL (CEFZIL) 500 mg tablet Take 1 Tab by mouth two (2) times a day for 10 days.  albuterol (PROVENTIL HFA, VENTOLIN HFA, PROAIR HFA) 90 mcg/actuation inhaler Take 2 Puffs by inhalation every four (4) hours as needed for Wheezing for up to 360 days.     azithromycin (ZITHROMAX) 250 mg tablet Take 2 tablets today, then take 1 tablet daily     No current facility-administered medications for this visit. Patient Active Problem List    Diagnosis Date Noted    Tobacco abuse 10/25/2019    Cancer of kidney ([facility] 10/25/2019    Obesity (BMI 30-39.9) 10/25/2019    Elevated blood pressure reading 01/03/2019       Past Medical History:   Diagnosis Date    Cancer ([facility]        No Known Allergies    Past Surgical History:   Procedure Laterality Date    HX NEPHRECTOMY      Partial left    HX ORTHOPAEDIC         Social History     Socioeconomic History    Marital status: SINGLE     Spouse name: Not on file    Number of children: Not on file    Years of education: Not on file    Highest education level: Not on file   Tobacco Use    Smoking status: Current Every Day Smoker     Packs/day: 1.50    Smokeless tobacco: Current User     Types: Chew, Snuff   Substance and Sexual Activity    Alcohol use: Yes     Frequency: Monthly or less    Drug use: No       Review of Systems   Constitutional: Negative. Negative for chills, fever, malaise/fatigue and weight loss. HENT: Positive for congestion. Negative for hearing loss. Eyes: Negative. Negative for blurred vision and double vision. Respiratory: Positive for cough and sputum production. Negative for shortness of breath. He is unaware of wheezing   Cardiovascular: Negative. Negative for chest pain and palpitations. Gastrointestinal: Negative. Negative for abdominal pain, blood in stool, heartburn, nausea and vomiting. Genitourinary: Negative. Negative for dysuria, frequency and urgency. Musculoskeletal: Negative. Negative for back pain, falls, myalgias and neck pain. Skin: Negative. Negative for rash. Neurological: Negative. Negative for dizziness, tingling, tremors, weakness and headaches. Endo/Heme/Allergies: Negative. Psychiatric/Behavioral: Negative. Negative for depression.         Objective:     Vitals:    02/10/20 1526   BP: (!) 139/91   Pulse: 74   Resp: 20   Temp: 98.8 °F (37.1 °C)   TempSrc: Oral   SpO2: 96%   Weight: 208 lb (94.3 kg)   Height: 5' 9\" (1.753 m)      Body mass index is 30.72 kg/m². Physical Exam  Vitals signs and nursing note reviewed. Constitutional:       General: He is not in acute distress. Appearance: Normal appearance. He is well-developed. He is not toxic-appearing. HENT:      Head: Normocephalic and atraumatic. Nose: No congestion. Mouth/Throat:      Pharynx: No oropharyngeal exudate or posterior oropharyngeal erythema. Eyes:      General: No scleral icterus. Right eye: No discharge. Left eye: No discharge. Cardiovascular:      Rate and Rhythm: Normal rate and regular rhythm. Heart sounds: Normal heart sounds. No murmur. No friction rub. No gallop. Pulmonary:      Effort: Pulmonary effort is normal. No respiratory distress. Breath sounds: Normal breath sounds. No wheezing, rhonchi or rales. Abdominal:      General: There is no distension. Musculoskeletal:         General: No swelling. Skin:     General: Skin is warm. Coloration: Skin is not jaundiced. Findings: No erythema or rash. Neurological:      Mental Status: He is alert. Psychiatric:         Mood and Affect: Mood normal.         Behavior: Behavior normal.           Health Maintenance Due   Topic Date Due    Pneumococcal 0-64 years (1 of 1 - PPSV23) 06/20/1980    DTaP/Tdap/Td series (1 - Tdap) 06/20/1985    Influenza Age 5 to Adult  08/01/2019         Assessment and orders:     Encounter Diagnoses     ICD-10-CM ICD-9-CM   1. Influenza A (H5N1) J09. X2 488.02   2. Moderate persistent asthmatic bronchitis without complication R20.67 766.43   3. LILLIAN (secretory otitis media), left H65.92 381.4   4. Elevated BP without diagnosis of hypertension R03.0 796.2     Diagnoses and all orders for this visit:    1. Influenza A (H5N1)-persistent symptoms. Negative chest x-ray. -     XR CHEST PA LAT; Future    2.  Moderate persistent asthmatic bronchitis without complication-  -     XR CHEST PA LAT; Future  -     cefPROZIL (CEFZIL) 500 mg tablet; Take 1 Tab by mouth two (2) times a day for 10 days. -     albuterol (PROVENTIL HFA, VENTOLIN HFA, PROAIR HFA) 90 mcg/actuation inhaler; Take 2 Puffs by inhalation every four (4) hours as needed for Wheezing for up to 360 days. 3. LILLIAN (secretory otitis media), left-use afrin nose spray-    4. Elevated BP without diagnosis of hypertension-recheck in 1 week. Plan of care:  Discussed diagnoses in detail with patient. Medication risks/benefits/side effects discussed with patient. All of the patient's questions were addressed. The patient understands and agrees with our plan of care. The patient knows to call back if they are unsure of or forget any changes we discussed today or if the symptoms change. The patient received an After-Visit Summary which contains VS, orders, medication list and allergy list. This can be used as a \"mini-medical record\" should they have to seek medical care while out of town. Patient Care Team:  Margarita Torres MD as PCP - General (Family Practice)  Jessica Duong MD as PCP - [facility] Empaneled Provider    Follow-up and Dispositions    · Return in about 1 week (around 2/17/2020), or if symptoms worsen or fail to improve. Signed By: Sherry Kendrick MD     February 10, 2020      ATTENTION:   This medical record was transcribed using an electronic medical records/speech recognition system. Although proofread, it may and can contain electronic, spelling and other errors. Corrections may be executed at a later time. Please feel free to contact me for any clarifications as needed.

## 2020-02-10 NOTE — LETTER
NOTIFICATION RETURN TO WORK  
 
2/10/2020 4:15 PM 
 
Mr. Harper You [facility] [address] [address] To Whom It May Concern: 
 
Harper You is currently under the care of [facility]. He will return to work on: 2/12/2020 If there are questions or concerns please have the patient contact our office.  
 
 
 
Sincerely, 
 
 
Abelardo Guaman MD

## 2020-02-10 NOTE — PROGRESS NOTES
1. Have you been to the ER, urgent care clinic since your last visit? Hospitalized since your last visit? No    2. Have you seen or consulted any other health care providers outside of the [address] [place] [address] since your last visit? Include any pap smears or colon screening. No    Reviewed record in preparation for visit and have necessary documentation  Goals that were addressed and/or need to be completed during or after this appointment include     Health Maintenance Due   Topic Date Due    Pneumococcal 0-64 years (1 of 1 - PPSV23) 06/20/1980    DTaP/Tdap/Td series (1 - Tdap) 06/20/1985    Influenza Age 5 to Adult  08/01/2019       Patient is accompanied by self I have received verbal consent from Soham Duran to discuss any/all medical information while they are present in the room.